# Patient Record
Sex: FEMALE | Race: WHITE | NOT HISPANIC OR LATINO | Employment: FULL TIME | URBAN - METROPOLITAN AREA
[De-identification: names, ages, dates, MRNs, and addresses within clinical notes are randomized per-mention and may not be internally consistent; named-entity substitution may affect disease eponyms.]

---
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
## 2018-01-12 ENCOUNTER — CARE COORDINATION (OUTPATIENT)
Dept: CARE COORDINATION | Facility: CLINIC | Age: 18
End: 2018-01-12

## 2018-02-22 ENCOUNTER — CARE COORDINATION (OUTPATIENT)
Dept: CARE COORDINATION | Facility: CLINIC | Age: 18
End: 2018-02-22

## 2019-08-28 ENCOUNTER — PATIENT OUTREACH (OUTPATIENT)
Dept: CARE COORDINATION | Facility: CLINIC | Age: 19
End: 2019-08-28

## 2019-08-28 DIAGNOSIS — Z65.9 PSYCHOSOCIAL PROBLEM: ICD-10-CM

## 2019-08-28 DIAGNOSIS — O30.90: Primary | ICD-10-CM

## 2019-08-28 DIAGNOSIS — Z65.8: Primary | ICD-10-CM

## 2019-08-28 ASSESSMENT — ACTIVITIES OF DAILY LIVING (ADL)
DEPENDENT_IADLS:: INDEPENDENT
DEPENDENT_IADLS:: INDEPENDENT

## 2019-10-08 ENCOUNTER — PATIENT OUTREACH (OUTPATIENT)
Dept: CARE COORDINATION | Facility: CLINIC | Age: 19
End: 2019-10-08

## 2024-03-07 LAB
HEPATITIS B SURFACE ANTIGEN (EXTERNAL): NEGATIVE
HIV1+2 AB SERPL QL IA: NONREACTIVE
RUBELLA ANTIBODY IGG (EXTERNAL): POSITIVE

## 2024-09-20 LAB — GROUP B STREPTOCOCCUS (EXTERNAL): NEGATIVE

## 2024-10-18 ENCOUNTER — HOSPITAL ENCOUNTER (INPATIENT)
Facility: CLINIC | Age: 24
LOS: 3 days | Discharge: HOME | End: 2024-10-21
Attending: OBSTETRICS & GYNECOLOGY | Admitting: OBSTETRICS & GYNECOLOGY
Payer: COMMERCIAL

## 2024-10-18 PROBLEM — Z36.89 ENCOUNTER FOR TRIAGE IN PREGNANT PATIENT: Status: ACTIVE | Noted: 2024-10-18

## 2024-10-18 LAB
ABO/RH(D): NORMAL
ALBUMIN MFR UR ELPH: 15.2 MG/DL
ALBUMIN SERPL BCG-MCNC: 3.5 G/DL (ref 3.5–5.2)
ALP SERPL-CCNC: 188 U/L (ref 40–150)
ALT SERPL W P-5'-P-CCNC: 8 U/L (ref 0–50)
ANION GAP SERPL CALCULATED.3IONS-SCNC: 16 MMOL/L (ref 7–15)
ANTIBODY SCREEN: NEGATIVE
AST SERPL W P-5'-P-CCNC: 19 U/L (ref 0–45)
BILIRUB SERPL-MCNC: 0.2 MG/DL
BUN SERPL-MCNC: 9.9 MG/DL (ref 6–20)
CALCIUM SERPL-MCNC: 9.4 MG/DL (ref 8.8–10.4)
CHLORIDE SERPL-SCNC: 103 MMOL/L (ref 98–107)
CREAT SERPL-MCNC: 0.65 MG/DL (ref 0.51–0.95)
CREAT UR-MCNC: 37.9 MG/DL
EGFRCR SERPLBLD CKD-EPI 2021: >90 ML/MIN/1.73M2
ERYTHROCYTE [DISTWIDTH] IN BLOOD BY AUTOMATED COUNT: 14.3 % (ref 10–15)
GLUCOSE BLDC GLUCOMTR-MCNC: 112 MG/DL (ref 70–99)
GLUCOSE SERPL-MCNC: 96 MG/DL (ref 70–99)
HCO3 SERPL-SCNC: 17 MMOL/L (ref 22–29)
HCT VFR BLD AUTO: 40.4 % (ref 35–47)
HGB BLD-MCNC: 13.4 G/DL (ref 11.7–15.7)
MCH RBC QN AUTO: 27.9 PG (ref 26.5–33)
MCHC RBC AUTO-ENTMCNC: 33.2 G/DL (ref 31.5–36.5)
MCV RBC AUTO: 84 FL (ref 78–100)
PLATELET # BLD AUTO: 274 10E3/UL (ref 150–450)
POTASSIUM SERPL-SCNC: 4.1 MMOL/L (ref 3.4–5.3)
PROT SERPL-MCNC: 6.5 G/DL (ref 6.4–8.3)
PROT/CREAT 24H UR: 0.4 MG/MG CR (ref 0–0.2)
RBC # BLD AUTO: 4.8 10E6/UL (ref 3.8–5.2)
SODIUM SERPL-SCNC: 136 MMOL/L (ref 135–145)
SPECIMEN EXPIRATION DATE: NORMAL
WBC # BLD AUTO: 9.6 10E3/UL (ref 4–11)

## 2024-10-18 PROCEDURE — 84156 ASSAY OF PROTEIN URINE: CPT | Performed by: OBSTETRICS & GYNECOLOGY

## 2024-10-18 PROCEDURE — 250N000013 HC RX MED GY IP 250 OP 250 PS 637: Performed by: OBSTETRICS & GYNECOLOGY

## 2024-10-18 PROCEDURE — 36415 COLL VENOUS BLD VENIPUNCTURE: CPT | Performed by: OBSTETRICS & GYNECOLOGY

## 2024-10-18 PROCEDURE — 82310 ASSAY OF CALCIUM: CPT | Performed by: OBSTETRICS & GYNECOLOGY

## 2024-10-18 PROCEDURE — 85014 HEMATOCRIT: CPT | Performed by: OBSTETRICS & GYNECOLOGY

## 2024-10-18 PROCEDURE — 86780 TREPONEMA PALLIDUM: CPT | Performed by: OBSTETRICS & GYNECOLOGY

## 2024-10-18 PROCEDURE — 120N000001 HC R&B MED SURG/OB

## 2024-10-18 PROCEDURE — 86900 BLOOD TYPING SEROLOGIC ABO: CPT | Performed by: OBSTETRICS & GYNECOLOGY

## 2024-10-18 PROCEDURE — G0463 HOSPITAL OUTPT CLINIC VISIT: HCPCS

## 2024-10-18 RX ORDER — LIDOCAINE 40 MG/G
CREAM TOPICAL
Status: DISCONTINUED | OUTPATIENT
Start: 2024-10-18 | End: 2024-10-19

## 2024-10-18 RX ORDER — KETOROLAC TROMETHAMINE 30 MG/ML
30 INJECTION, SOLUTION INTRAMUSCULAR; INTRAVENOUS
Status: DISCONTINUED | OUTPATIENT
Start: 2024-10-18 | End: 2024-10-19

## 2024-10-18 RX ORDER — METOCLOPRAMIDE 10 MG/1
10 TABLET ORAL EVERY 6 HOURS PRN
Status: DISCONTINUED | OUTPATIENT
Start: 2024-10-18 | End: 2024-10-19

## 2024-10-18 RX ORDER — CARBOPROST TROMETHAMINE 250 UG/ML
250 INJECTION, SOLUTION INTRAMUSCULAR
Status: DISCONTINUED | OUTPATIENT
Start: 2024-10-18 | End: 2024-10-19

## 2024-10-18 RX ORDER — METHYLERGONOVINE MALEATE 0.2 MG/ML
200 INJECTION INTRAVENOUS
Status: DISCONTINUED | OUTPATIENT
Start: 2024-10-18 | End: 2024-10-19

## 2024-10-18 RX ORDER — PROCHLORPERAZINE MALEATE 10 MG
10 TABLET ORAL EVERY 6 HOURS PRN
Status: DISCONTINUED | OUTPATIENT
Start: 2024-10-18 | End: 2024-10-19

## 2024-10-18 RX ORDER — MISOPROSTOL 100 UG/1
25 TABLET ORAL
Status: DISCONTINUED | OUTPATIENT
Start: 2024-10-18 | End: 2024-10-19

## 2024-10-18 RX ORDER — ONDANSETRON 4 MG/1
4 TABLET, ORALLY DISINTEGRATING ORAL EVERY 6 HOURS PRN
Status: DISCONTINUED | OUTPATIENT
Start: 2024-10-18 | End: 2024-10-19

## 2024-10-18 RX ORDER — NALOXONE HYDROCHLORIDE 0.4 MG/ML
0.2 INJECTION, SOLUTION INTRAMUSCULAR; INTRAVENOUS; SUBCUTANEOUS
Status: DISCONTINUED | OUTPATIENT
Start: 2024-10-18 | End: 2024-10-19

## 2024-10-18 RX ORDER — OXYTOCIN 10 [USP'U]/ML
10 INJECTION, SOLUTION INTRAMUSCULAR; INTRAVENOUS
Status: DISCONTINUED | OUTPATIENT
Start: 2024-10-18 | End: 2024-10-19

## 2024-10-18 RX ORDER — LOPERAMIDE HYDROCHLORIDE 2 MG/1
2 CAPSULE ORAL
Status: DISCONTINUED | OUTPATIENT
Start: 2024-10-18 | End: 2024-10-19

## 2024-10-18 RX ORDER — ONDANSETRON 2 MG/ML
4 INJECTION INTRAMUSCULAR; INTRAVENOUS EVERY 6 HOURS PRN
Status: DISCONTINUED | OUTPATIENT
Start: 2024-10-18 | End: 2024-10-19

## 2024-10-18 RX ORDER — OXYTOCIN/0.9 % SODIUM CHLORIDE 30/500 ML
100-340 PLASTIC BAG, INJECTION (ML) INTRAVENOUS CONTINUOUS PRN
Status: DISCONTINUED | OUTPATIENT
Start: 2024-10-18 | End: 2024-10-19

## 2024-10-18 RX ORDER — METOCLOPRAMIDE HYDROCHLORIDE 5 MG/ML
10 INJECTION INTRAMUSCULAR; INTRAVENOUS EVERY 6 HOURS PRN
Status: DISCONTINUED | OUTPATIENT
Start: 2024-10-18 | End: 2024-10-19

## 2024-10-18 RX ORDER — NALOXONE HYDROCHLORIDE 0.4 MG/ML
0.4 INJECTION, SOLUTION INTRAMUSCULAR; INTRAVENOUS; SUBCUTANEOUS
Status: DISCONTINUED | OUTPATIENT
Start: 2024-10-18 | End: 2024-10-19

## 2024-10-18 RX ORDER — CITRIC ACID/SODIUM CITRATE 334-500MG
30 SOLUTION, ORAL ORAL
Status: DISCONTINUED | OUTPATIENT
Start: 2024-10-18 | End: 2024-10-19

## 2024-10-18 RX ORDER — MORPHINE SULFATE 10 MG/ML
10 INJECTION, SOLUTION INTRAMUSCULAR; INTRAVENOUS
Status: DISCONTINUED | OUTPATIENT
Start: 2024-10-18 | End: 2024-10-19

## 2024-10-18 RX ORDER — FENTANYL CITRATE 50 UG/ML
100 INJECTION, SOLUTION INTRAMUSCULAR; INTRAVENOUS
Status: DISCONTINUED | OUTPATIENT
Start: 2024-10-18 | End: 2024-10-19

## 2024-10-18 RX ORDER — OXYTOCIN 10 [USP'U]/ML
10 INJECTION, SOLUTION INTRAMUSCULAR; INTRAVENOUS
Status: COMPLETED | OUTPATIENT
Start: 2024-10-18 | End: 2024-10-19

## 2024-10-18 RX ORDER — LEVOTHYROXINE SODIUM 50 UG/1
50 TABLET ORAL DAILY
COMMUNITY

## 2024-10-18 RX ORDER — ACETAMINOPHEN 325 MG/1
650 TABLET ORAL EVERY 4 HOURS PRN
Status: DISCONTINUED | OUTPATIENT
Start: 2024-10-18 | End: 2024-10-19

## 2024-10-18 RX ORDER — LOPERAMIDE HYDROCHLORIDE 2 MG/1
4 CAPSULE ORAL
Status: DISCONTINUED | OUTPATIENT
Start: 2024-10-18 | End: 2024-10-19

## 2024-10-18 RX ORDER — VITAMIN A, VITAMIN C, VITAMIN D-3, VITAMIN E, VITAMIN B-1, VITAMIN B-2, NIACIN, VITAMIN B-6, CALCIUM, IRON, ZINC, COPPER 4000; 120; 400; 22; 1.84; 3; 20; 10; 1; 12; 200; 27; 25; 2 [IU]/1; MG/1; [IU]/1; MG/1; MG/1; MG/1; MG/1; MG/1; MG/1; UG/1; MG/1; MG/1; MG/1; MG/1
1 TABLET ORAL DAILY
COMMUNITY

## 2024-10-18 RX ORDER — TRANEXAMIC ACID 10 MG/ML
1 INJECTION, SOLUTION INTRAVENOUS EVERY 30 MIN PRN
Status: DISCONTINUED | OUTPATIENT
Start: 2024-10-18 | End: 2024-10-19

## 2024-10-18 RX ORDER — HYDROXYZINE HYDROCHLORIDE 50 MG/1
50 TABLET, FILM COATED ORAL
Status: DISCONTINUED | OUTPATIENT
Start: 2024-10-18 | End: 2024-10-19

## 2024-10-18 RX ORDER — OXYTOCIN/0.9 % SODIUM CHLORIDE 30/500 ML
340 PLASTIC BAG, INJECTION (ML) INTRAVENOUS CONTINUOUS PRN
Status: DISCONTINUED | OUTPATIENT
Start: 2024-10-18 | End: 2024-10-19

## 2024-10-18 RX ORDER — MISOPROSTOL 200 UG/1
800 TABLET ORAL
Status: DISCONTINUED | OUTPATIENT
Start: 2024-10-18 | End: 2024-10-19

## 2024-10-18 RX ORDER — LIDOCAINE 40 MG/G
CREAM TOPICAL
Status: DISCONTINUED | OUTPATIENT
Start: 2024-10-18 | End: 2024-10-18 | Stop reason: HOSPADM

## 2024-10-18 RX ORDER — MISOPROSTOL 200 UG/1
400 TABLET ORAL
Status: DISCONTINUED | OUTPATIENT
Start: 2024-10-18 | End: 2024-10-19

## 2024-10-18 RX ORDER — IBUPROFEN 800 MG/1
800 TABLET, FILM COATED ORAL
Status: DISCONTINUED | OUTPATIENT
Start: 2024-10-18 | End: 2024-10-19

## 2024-10-18 RX ADMIN — MISOPROSTOL 25 MCG: 100 TABLET ORAL at 22:27

## 2024-10-18 ASSESSMENT — ACTIVITIES OF DAILY LIVING (ADL)
ADLS_ACUITY_SCORE: 18
ADLS_ACUITY_SCORE: 18
ADLS_ACUITY_SCORE: 35
ADLS_ACUITY_SCORE: 35

## 2024-10-19 ENCOUNTER — ANESTHESIA (OUTPATIENT)
Dept: OBGYN | Facility: CLINIC | Age: 24
End: 2024-10-19
Payer: COMMERCIAL

## 2024-10-19 ENCOUNTER — ANESTHESIA EVENT (OUTPATIENT)
Dept: OBGYN | Facility: CLINIC | Age: 24
End: 2024-10-19
Payer: COMMERCIAL

## 2024-10-19 LAB
GLUCOSE BLDC GLUCOMTR-MCNC: 84 MG/DL (ref 70–99)
GLUCOSE BLDC GLUCOMTR-MCNC: 88 MG/DL (ref 70–99)
GLUCOSE BLDC GLUCOMTR-MCNC: 94 MG/DL (ref 70–99)
T PALLIDUM AB SER QL: NONREACTIVE

## 2024-10-19 PROCEDURE — 3E0R3BZ INTRODUCTION OF ANESTHETIC AGENT INTO SPINAL CANAL, PERCUTANEOUS APPROACH: ICD-10-PCS | Performed by: ANESTHESIOLOGY

## 2024-10-19 PROCEDURE — 250N000009 HC RX 250: Performed by: OBSTETRICS & GYNECOLOGY

## 2024-10-19 PROCEDURE — 250N000013 HC RX MED GY IP 250 OP 250 PS 637: Performed by: OBSTETRICS & GYNECOLOGY

## 2024-10-19 PROCEDURE — 370N000003 HC ANESTHESIA WARD SERVICE: Performed by: ANESTHESIOLOGY

## 2024-10-19 PROCEDURE — 120N000001 HC R&B MED SURG/OB

## 2024-10-19 PROCEDURE — 258N000003 HC RX IP 258 OP 636: Performed by: ANESTHESIOLOGY

## 2024-10-19 PROCEDURE — 250N000011 HC RX IP 250 OP 636: Performed by: OBSTETRICS & GYNECOLOGY

## 2024-10-19 PROCEDURE — 0KQM0ZZ REPAIR PERINEUM MUSCLE, OPEN APPROACH: ICD-10-PCS | Performed by: OBSTETRICS & GYNECOLOGY

## 2024-10-19 PROCEDURE — 250N000011 HC RX IP 250 OP 636: Performed by: ANESTHESIOLOGY

## 2024-10-19 PROCEDURE — 722N000001 HC LABOR CARE VAGINAL DELIVERY SINGLE

## 2024-10-19 PROCEDURE — 00HU33Z INSERTION OF INFUSION DEVICE INTO SPINAL CANAL, PERCUTANEOUS APPROACH: ICD-10-PCS | Performed by: ANESTHESIOLOGY

## 2024-10-19 PROCEDURE — 250N000009 HC RX 250: Performed by: ANESTHESIOLOGY

## 2024-10-19 PROCEDURE — 250N000011 HC RX IP 250 OP 636: Mod: JZ | Performed by: ANESTHESIOLOGY

## 2024-10-19 RX ORDER — ONDANSETRON 2 MG/ML
4 INJECTION INTRAMUSCULAR; INTRAVENOUS EVERY 6 HOURS PRN
Status: DISCONTINUED | OUTPATIENT
Start: 2024-10-19 | End: 2024-10-19

## 2024-10-19 RX ORDER — NALBUPHINE HYDROCHLORIDE 10 MG/ML
2.5-5 INJECTION INTRAMUSCULAR; INTRAVENOUS; SUBCUTANEOUS EVERY 6 HOURS PRN
Status: DISCONTINUED | OUTPATIENT
Start: 2024-10-19 | End: 2024-10-19

## 2024-10-19 RX ORDER — SCOLOPAMINE TRANSDERMAL SYSTEM 1 MG/1
1 PATCH, EXTENDED RELEASE TRANSDERMAL
Status: COMPLETED | OUTPATIENT
Start: 2024-10-19 | End: 2024-10-20

## 2024-10-19 RX ORDER — BUPIVACAINE HYDROCHLORIDE 2.5 MG/ML
INJECTION, SOLUTION EPIDURAL; INFILTRATION; INTRACAUDAL
Status: COMPLETED | OUTPATIENT
Start: 2024-10-19 | End: 2024-10-19

## 2024-10-19 RX ORDER — CARBOPROST TROMETHAMINE 250 UG/ML
250 INJECTION, SOLUTION INTRAMUSCULAR
Status: DISCONTINUED | OUTPATIENT
Start: 2024-10-19 | End: 2024-10-21 | Stop reason: HOSPADM

## 2024-10-19 RX ORDER — BISACODYL 10 MG
10 SUPPOSITORY, RECTAL RECTAL DAILY PRN
Status: DISCONTINUED | OUTPATIENT
Start: 2024-10-19 | End: 2024-10-21 | Stop reason: HOSPADM

## 2024-10-19 RX ORDER — LIDOCAINE 40 MG/G
CREAM TOPICAL
Status: DISCONTINUED | OUTPATIENT
Start: 2024-10-19 | End: 2024-10-19

## 2024-10-19 RX ORDER — MISOPROSTOL 200 UG/1
800 TABLET ORAL
Status: DISCONTINUED | OUTPATIENT
Start: 2024-10-19 | End: 2024-10-21 | Stop reason: HOSPADM

## 2024-10-19 RX ORDER — TRANEXAMIC ACID 10 MG/ML
1 INJECTION, SOLUTION INTRAVENOUS EVERY 30 MIN PRN
Status: DISCONTINUED | OUTPATIENT
Start: 2024-10-19 | End: 2024-10-21 | Stop reason: HOSPADM

## 2024-10-19 RX ORDER — ACETAMINOPHEN 325 MG/1
650 TABLET ORAL EVERY 4 HOURS PRN
Status: DISCONTINUED | OUTPATIENT
Start: 2024-10-19 | End: 2024-10-21 | Stop reason: HOSPADM

## 2024-10-19 RX ORDER — IBUPROFEN 800 MG/1
800 TABLET, FILM COATED ORAL EVERY 6 HOURS PRN
Status: DISCONTINUED | OUTPATIENT
Start: 2024-10-19 | End: 2024-10-21 | Stop reason: HOSPADM

## 2024-10-19 RX ORDER — HYDROCORTISONE 25 MG/G
CREAM TOPICAL 3 TIMES DAILY PRN
Status: DISCONTINUED | OUTPATIENT
Start: 2024-10-19 | End: 2024-10-21 | Stop reason: HOSPADM

## 2024-10-19 RX ORDER — NICOTINE POLACRILEX 4 MG
15-30 LOZENGE BUCCAL
Status: DISCONTINUED | OUTPATIENT
Start: 2024-10-19 | End: 2024-10-19

## 2024-10-19 RX ORDER — DOCUSATE SODIUM 100 MG/1
100 CAPSULE, LIQUID FILLED ORAL DAILY
Status: DISCONTINUED | OUTPATIENT
Start: 2024-10-19 | End: 2024-10-21 | Stop reason: HOSPADM

## 2024-10-19 RX ORDER — LEVOTHYROXINE SODIUM 50 UG/1
50 TABLET ORAL
Status: DISCONTINUED | OUTPATIENT
Start: 2024-10-19 | End: 2024-10-21 | Stop reason: HOSPADM

## 2024-10-19 RX ORDER — OXYTOCIN/0.9 % SODIUM CHLORIDE 30/500 ML
340 PLASTIC BAG, INJECTION (ML) INTRAVENOUS CONTINUOUS PRN
Status: DISCONTINUED | OUTPATIENT
Start: 2024-10-19 | End: 2024-10-21 | Stop reason: HOSPADM

## 2024-10-19 RX ORDER — LOPERAMIDE HYDROCHLORIDE 2 MG/1
2 CAPSULE ORAL
Status: DISCONTINUED | OUTPATIENT
Start: 2024-10-19 | End: 2024-10-21 | Stop reason: HOSPADM

## 2024-10-19 RX ORDER — LOPERAMIDE HYDROCHLORIDE 2 MG/1
4 CAPSULE ORAL
Status: DISCONTINUED | OUTPATIENT
Start: 2024-10-19 | End: 2024-10-21 | Stop reason: HOSPADM

## 2024-10-19 RX ORDER — DEXTROSE MONOHYDRATE 25 G/50ML
25-50 INJECTION, SOLUTION INTRAVENOUS
Status: DISCONTINUED | OUTPATIENT
Start: 2024-10-19 | End: 2024-10-19

## 2024-10-19 RX ORDER — MODIFIED LANOLIN
OINTMENT (GRAM) TOPICAL
Status: DISCONTINUED | OUTPATIENT
Start: 2024-10-19 | End: 2024-10-21 | Stop reason: HOSPADM

## 2024-10-19 RX ORDER — SODIUM CHLORIDE, SODIUM LACTATE, POTASSIUM CHLORIDE, CALCIUM CHLORIDE 600; 310; 30; 20 MG/100ML; MG/100ML; MG/100ML; MG/100ML
INJECTION, SOLUTION INTRAVENOUS CONTINUOUS PRN
Status: DISCONTINUED | OUTPATIENT
Start: 2024-10-19 | End: 2024-10-19

## 2024-10-19 RX ORDER — OXYTOCIN/0.9 % SODIUM CHLORIDE 30/500 ML
1-24 PLASTIC BAG, INJECTION (ML) INTRAVENOUS CONTINUOUS
Status: DISCONTINUED | OUTPATIENT
Start: 2024-10-19 | End: 2024-10-19

## 2024-10-19 RX ORDER — MISOPROSTOL 200 UG/1
400 TABLET ORAL
Status: DISCONTINUED | OUTPATIENT
Start: 2024-10-19 | End: 2024-10-21 | Stop reason: HOSPADM

## 2024-10-19 RX ORDER — OXYTOCIN 10 [USP'U]/ML
10 INJECTION, SOLUTION INTRAMUSCULAR; INTRAVENOUS
Status: DISCONTINUED | OUTPATIENT
Start: 2024-10-19 | End: 2024-10-21 | Stop reason: HOSPADM

## 2024-10-19 RX ORDER — ONDANSETRON 4 MG/1
4 TABLET, ORALLY DISINTEGRATING ORAL EVERY 6 HOURS PRN
Status: DISCONTINUED | OUTPATIENT
Start: 2024-10-19 | End: 2024-10-19

## 2024-10-19 RX ORDER — BUPIVACAINE HYDROCHLORIDE 2.5 MG/ML
10 INJECTION, SOLUTION EPIDURAL; INFILTRATION; INTRACAUDAL ONCE
Status: DISCONTINUED | OUTPATIENT
Start: 2024-10-19 | End: 2024-10-19

## 2024-10-19 RX ORDER — METHYLERGONOVINE MALEATE 0.2 MG/ML
200 INJECTION INTRAVENOUS
Status: DISCONTINUED | OUTPATIENT
Start: 2024-10-19 | End: 2024-10-21 | Stop reason: HOSPADM

## 2024-10-19 RX ORDER — FENTANYL CITRATE-0.9 % NACL/PF 10 MCG/ML
100 PLASTIC BAG, INJECTION (ML) INTRAVENOUS EVERY 5 MIN PRN
Status: DISCONTINUED | OUTPATIENT
Start: 2024-10-19 | End: 2024-10-19

## 2024-10-19 RX ADMIN — MISOPROSTOL 25 MCG: 100 TABLET ORAL at 00:28

## 2024-10-19 RX ADMIN — Medication 340 ML/HR: at 11:43

## 2024-10-19 RX ADMIN — BUPIVACAINE HYDROCHLORIDE 10 ML: 2.5 INJECTION, SOLUTION EPIDURAL; INFILTRATION; INTRACAUDAL at 09:43

## 2024-10-19 RX ADMIN — LEVOTHYROXINE SODIUM 50 MCG: 0.05 TABLET ORAL at 06:58

## 2024-10-19 RX ADMIN — IBUPROFEN 800 MG: 800 TABLET, FILM COATED ORAL at 16:57

## 2024-10-19 RX ADMIN — Medication: at 10:37

## 2024-10-19 RX ADMIN — SCOPALAMINE 1 PATCH: 1 PATCH, EXTENDED RELEASE TRANSDERMAL at 08:47

## 2024-10-19 RX ADMIN — OXYTOCIN 10 UNITS: 10 INJECTION, SOLUTION INTRAMUSCULAR; INTRAVENOUS at 12:09

## 2024-10-19 RX ADMIN — MISOPROSTOL 25 MCG: 100 TABLET ORAL at 02:42

## 2024-10-19 RX ADMIN — SODIUM CHLORIDE, POTASSIUM CHLORIDE, SODIUM LACTATE AND CALCIUM CHLORIDE 500 ML: 600; 310; 30; 20 INJECTION, SOLUTION INTRAVENOUS at 08:42

## 2024-10-19 ASSESSMENT — ACTIVITIES OF DAILY LIVING (ADL)
ADLS_ACUITY_SCORE: 18

## 2024-10-19 NOTE — ANESTHESIA PREPROCEDURE EVALUATION
"Anesthesia Pre-Procedure Evaluation    Patient: Georgina Mckinley   MRN: [id number] : 2000        Procedure : * No procedures listed *          Past Medical History:   Diagnosis Date    Thyroid disease     hypothyroidism      History reviewed. No pertinent surgical history.   No Known Allergies   Social History     Tobacco Use    Smoking status: Never    Smokeless tobacco: Never   Substance Use Topics    Alcohol use: Not Currently      Wt Readings from Last 1 Encounters:   10/18/24 86.1 kg (189 lb 12.8 oz)        Anesthesia Evaluation            ROS/MED HX  ENT/Pulmonary:       Neurologic:       Cardiovascular:     (+)  - - PIH  -  - -                                      METS/Exercise Tolerance:     Hematologic:  - neg hematologic  ROS     Musculoskeletal:       GI/Hepatic:       Renal/Genitourinary:       Endo:     (+)               Obesity,       Psychiatric/Substance Use:       Infectious Disease:       Malignancy:       Other:    at 40+2 presented with elevated home BP readings, admitted for IOL            OUTSIDE LABS:  CBC:   Lab Results   Component Value Date    WBC 9.6 10/18/2024    HGB 13.4 10/18/2024    HCT 40.4 10/18/2024     10/18/2024     BMP:   Lab Results   Component Value Date     10/18/2024    POTASSIUM 4.1 10/18/2024    CHLORIDE 103 10/18/2024    CO2 17 (L) 10/18/2024    BUN 9.9 10/18/2024    CR 0.65 10/18/2024    GLC 94 10/19/2024    GLC 96 10/18/2024     COAGS: No results found for: \"PTT\", \"INR\", \"FIBR\"  POC: No results found for: \"BGM\", \"HCG\", \"HCGS\"  HEPATIC:   Lab Results   Component Value Date    ALBUMIN 3.5 10/18/2024    PROTTOTAL 6.5 10/18/2024    ALT 8 10/18/2024    AST 19 10/18/2024    ALKPHOS 188 (H) 10/18/2024    BILITOTAL 0.2 10/18/2024     OTHER:   Lab Results   Component Value Date    CRISELDA 9.4 10/18/2024       Anesthesia Plan    ASA Status:  2       Anesthesia Type: Epidural.              Consents    Anesthesia Plan(s) and associated risks, benefits, " and realistic alternatives discussed. Questions answered and patient/representative(s) expressed understanding.     - Discussed:     - Discussed with:  Patient            Postoperative Care            Comments:    Other Comments: Continuous Labor Epidural: Indication is for labor pain. Following a discussion of the procedure, epidural expectations, and risks and benefits (risks including, but not limited to, nerve damage, infection, bleeding/hematoma, inadvertent dural puncture, spinal headache, partial or failed block possibly requiring epidural replacement), the patient appears to understand and consents to proceed. Questions were encouraged and answered prior to placement.              Guerda Hong MD    I have reviewed the pertinent notes and labs in the chart from the past 30 days and (re)examined the patient.  Any updates or changes from those notes are reflected in this note.

## 2024-10-19 NOTE — PROVIDER NOTIFICATION
10/19/24 1022   Provider Notification   Provider Name/Title Dr. MOY Vidales   Method of Notification Electronic Page   Notification Reason Status Update;SVE     Pt Is complete, will straight cath and set up to push

## 2024-10-19 NOTE — PLAN OF CARE
"  Data: Georgina Mckinley transferred to [facility] via wheelchair at 1450. Baby transferred via parent's arms.  Action: Receiving unit notified of transfer: Yes. Patient and family notified of room change. Report given to Amy at 1515. Belongings sent to receiving unit. Accompanied by Registered Nurse. Oriented patient to surroundings. Call light within reach. ID bands double-checked with receiving RN.  Response: Patient tolerated transfer and is stable.  Pt is bonding well with baby girl. Pt is breastfeeding and supplementing with EBM. Tolerates eating and drinking.  Fundus is firm, midline and 2 cm below umbilicus. Lochia is scant, rubra and pt denies having any clots. VSS. Voiding and passing gas. Ambulating independently. Tolerating perineal pain well with and ice.- rating a 0/10.     Problem: Adult Inpatient Plan of Care  Goal: Plan of Care Review  Description: The Plan of Care Review/Shift note should be completed every shift.  The Outcome Evaluation is a brief statement about your assessment that the patient is improving, declining, or no change.  This information will be displayed automatically on your shift  note.  10/19/2024 1520 by Alessandra Willis RN  Outcome: Progressing  Flowsheets (Taken 10/19/2024 1520)  Outcome Evaluation: VSS. tolerting pain with ice packs. Due to void. up independently. No IV.  Plan of Care Reviewed With: patient  10/19/2024 1519 by Alessandra Willis RN  Outcome: Progressing  Goal: Patient-Specific Goal (Individualized)  Description: You can add care plan individualizations to a care plan. Examples of Individualization might be:  \"Parent requests to be called daily at 9am for status\", \"I have a hard time hearing out of my right ear\", or \"Do not touch me to wake me up as it startles  me\".  10/19/2024 1520 by Alessandra Willis RN  Outcome: Progressing  10/19/2024 1519 by Alessandra Willis RN  Outcome: Progressing  Goal: Absence of Hospital-Acquired Illness or " Injury  10/19/2024 1520 by Alessandra Willis RN  Outcome: Progressing  10/19/2024 1519 by Alessandra Willis RN  Outcome: Progressing  Intervention: Prevent Skin Injury  Recent Flowsheet Documentation  Taken 10/19/2024 1220 by Alessandra Willis RN  Body Position: position changed independently  Goal: Optimal Comfort and Wellbeing  10/19/2024 1520 by Alessandra Willis RN  Outcome: Progressing  10/19/2024 1519 by Alessandra Willis RN  Outcome: Progressing  Intervention: Provide Person-Centered Care  Recent Flowsheet Documentation  Taken 10/19/2024 1220 by Alessandra Willis RN  Trust Relationship/Rapport:   care explained   choices provided   questions answered   questions encouraged   thoughts/feelings acknowledged  Goal: Readiness for Transition of Care  10/19/2024 1520 by Alessandra Willis RN  Outcome: Progressing  10/19/2024 1519 by Alessandra Willis RN  Outcome: Progressing     Problem: Hypertensive Disorders in Pregnancy  Goal: Patient-Fetal Stabilization  10/19/2024 1520 by Alessandra Willis RN  Outcome: Progressing  10/19/2024 1519 by Alessandra Willis RN  Outcome: Progressing  Intervention: Optimize Blood Pressure and Fluid Status  Recent Flowsheet Documentation  Taken 10/19/2024 1220 by Alessandra Willis RN  Fluid/Electrolyte Management: fluids provided     Problem: Diabetes in Pregnancy  Goal: Optimal Coping  10/19/2024 1520 by Alessandra Willis RN  Outcome: Progressing  10/19/2024 1519 by Alessandra Willis RN  Outcome: Progressing  Intervention: Support Wellbeing and Self-Management Success  Recent Flowsheet Documentation  Taken 10/19/2024 1220 by Alessandra Willis RN  Supportive Measures:   active listening utilized   decision-making supported   self-care encouraged   self-reflection promoted   verbalization of feelings encouraged  Family/Support System Care:   involvement promoted   presence promoted   support provided  Goal: Blood Glucose Level Within Targeted Range  10/19/2024  1520 by Alessandra Willis RN  Outcome: Progressing  10/19/2024 1519 by Alessandra Willis RN  Outcome: Progressing  Intervention: Monitor and Manage Glycemia  Recent Flowsheet Documentation  Taken 10/19/2024 1220 by Alessandra Willis RN  Glycemic Management: blood glucose monitored     Problem: Postpartum (Vaginal Delivery)  Goal: Successful Parent Role Transition  10/19/2024 1520 by Alessandra Willis RN  Outcome: Progressing  10/19/2024 1519 by Alessandra Willis RN  Outcome: Progressing  Intervention: Support Parent Role Transition  Recent Flowsheet Documentation  Taken 10/19/2024 1220 by Alessandra Willis RN  Supportive Measures:   active listening utilized   decision-making supported   self-care encouraged   self-reflection promoted   verbalization of feelings encouraged  Parent-Child Attachment Promotion:   caring behavior modeled   cue recognition promoted   face-to-face positioning promoted   interaction encouraged   parent/caregiver presence encouraged   participation in care promoted   positive reinforcement provided   rooming-in promoted   skin-to-skin contact encouraged  Goal: Hemostasis  10/19/2024 1520 by Alessandra Willis RN  Outcome: Progressing  10/19/2024 1519 by Alessandra Willis RN  Outcome: Progressing  Intervention: Manage Bleeding  Recent Flowsheet Documentation  Taken 10/19/2024 1220 by Alessandra Willis RN  Syncope Management: position changed slowly  Goal: Absence of Infection Signs and Symptoms  10/19/2024 1520 by Alessandra Willis RN  Outcome: Progressing  10/19/2024 1519 by Alessandra Willis RN  Outcome: Progressing  Intervention: Prevent or Manage Infection  Recent Flowsheet Documentation  Taken 10/19/2024 1220 by Alessandra Willis RN  Infection Management: aseptic technique maintained  Goal: Anesthesia/Sedation Recovery  10/19/2024 1520 by Alessandra Willis RN  Outcome: Progressing  10/19/2024 1519 by Alessandra Willis RN  Outcome: Progressing  Goal: Optimal  Pain Control and Function  10/19/2024 1520 by Alessandra Willis RN  Outcome: Progressing  10/19/2024 1519 by Alessandra Willis RN  Outcome: Progressing  Goal: Effective Urinary Elimination  10/19/2024 1520 by Alessandra Willis RN  Outcome: Progressing  10/19/2024 1519 by Alessandra Willis RN  Outcome: Progressing

## 2024-10-19 NOTE — PLAN OF CARE
"Problem: Adult Inpatient Plan of Care  Goal: Plan of Care Review  Description: The Plan of Care Review/Shift note should be completed every shift.  The Outcome Evaluation is a brief statement about your assessment that the patient is improving, declining, or no change.  This information will be displayed automatically on your shift  note.  Outcome: Progressing  Goal: Patient-Specific Goal (Individualized)  Description: You can add care plan individualizations to a care plan. Examples of Individualization might be:  \"Parent requests to be called daily at 9am for status\", \"I have a hard time hearing out of my right ear\", or \"Do not touch me to wake me up as it startles  me\".  Outcome: Progressing  Goal: Absence of Hospital-Acquired Illness or Injury  Outcome: Progressing  Goal: Optimal Comfort and Wellbeing  Outcome: Progressing  Intervention: Provide Person-Centered Care  Recent Flowsheet Documentation  Taken 10/18/2024 2236 by Zayda Graham, RN  Trust Relationship/Rapport:   care explained   choices provided   questions answered   questions encouraged   thoughts/feelings acknowledged  Goal: Readiness for Transition of Care  Outcome: Progressing  Intervention: Mutually Develop Transition Plan  Recent Flowsheet Documentation  Taken 10/18/2024 2138 by Zayda Graham, RN  Equipment Currently Used at Home: none     Problem: Labor  Goal: Hemostasis  Outcome: Progressing  Goal: Stable Fetal Wellbeing  Outcome: Progressing  Goal: Effective Progression to Delivery  Outcome: Progressing  Goal: Absence of Infection Signs and Symptoms  Outcome: Progressing  Goal: Acceptable Pain Control  Outcome: Progressing  Goal: Normal Uterine Contraction Pattern  Outcome: Progressing     Problem: Hypertensive Disorders in Pregnancy  Goal: Patient-Fetal Stabilization  Outcome: Progressing     Problem: Diabetes in Pregnancy  Goal: Optimal Coping  Outcome: Progressing  Goal: Blood Glucose Level Within Targeted Range  Outcome: " Progressing

## 2024-10-19 NOTE — PROVIDER NOTIFICATION
10/19/24 0930   Provider Notification   Provider Name/Title Dr. Hal MACIAS   Method of Notification At Bedside   Notification Reason Other (Comment)  (epidural)

## 2024-10-19 NOTE — PROVIDER NOTIFICATION
10/19/24 1205   Provider Notification   Provider Name/Title Dr. MOY Vidales   Method of Notification In Department     IV infiltrated, pitocin hasn't fully run in the IV yet. Can we do IM pitocin?    MD okay with IM pitocin and okay to wait on another IV placement, will place a new IV if needed for any extra bleeding or BP issues.

## 2024-10-19 NOTE — PROVIDER NOTIFICATION
10/19/24 0955   Provider Notification   Provider Name/Title Dr. MOY Vidales   Method of Notification Electronic Page   Notification Reason Status Update;SVE     Updated MD through [employer] messaging, pt requested epidural was 5/90/-1, just received epidural, having more earlys present on FHR tracing.    MD is now on unit and reviewed tracing, MD would like SVE with gilmore placement.

## 2024-10-19 NOTE — ANESTHESIA PROCEDURE NOTES
Epidural catheter Procedure Note    Pre-Procedure   Staff -        Anesthesiologist:  Guerda Hong MD       Performed By: anesthesiologist       Referred By: MOY Vidales       Location: OB       Pre-Anesthestic Checklist: patient identified, IV checked, risks and benefits discussed, informed consent, monitors and equipment checked, pre-op evaluation and at physician/surgeon's request  Timeout:       Correct Patient: Yes        Correct Procedure: Yes        Correct Site: Yes        Correct Position: Yes   Procedure Documentation  Procedure: epidural catheter       Diagnosis: Labor pain       Patient Position: sitting       Skin prep: Chloraprep       Local skin infiltrated with 2 mL of 1% lidocaine.        Insertion Site: L4-5. (midline approach).       Technique: LORT saline        VIRGIL at 5 cm.       Needle Type: Touhy needle       Needle Gauge: 17.        Needle Length (Inches): 3.5        Catheter: 19 G.          Catheter threaded easily.         5 cm epidural space.         Threaded 10 cm at skin.         # of attempts: 1 and  # of redirects:  0    Assessment/Narrative         Paresthesias: No.       Test dose of 3 mL lidocaine 1.5% w/ 1:200,000 epinephrine at 09:40 CDT.         Test dose negative, 3 minutes after injection, for signs of intravascular, subdural, or intrathecal injection.       Insertion/Infusion Method: LORT saline       Aspiration negative for Heme or CSF via Epidural Catheter.    Medication(s) Administered   0.25% Bupivacaine PF (Epidural) - EPIDURAL   10 mL - 10/19/2024 9:43:00 AM   Comments:  Procedure tolerated well without apparent complications. Repeat aspiration negative for CSF. Initial MDA bolus of 0.25% bupivacaine was incrementally given without difficulty or complications. No abrupt changes in sensation or hemodynamic instability.  Epidural infusion (0.125% bupi with fentanyl 2mcg/ml) started at 10 ml/hr with PCEA of 5 ml q15min with a max cumulative dose of 25 ml/hr. PCEA  "instructions given and use encouraged PRN. Epidural expectations again reviewed and questions answered. Patient hemodynamically stable.  Epidural functionality to be reassessed later via vital sign flowsheet, nursing communication, and/or patient report.  Anesthesiologist immediately available for ongoing assessment and management.            FOR [facility] ([facility]) ONLY:   Pain Team Contact information: please page the Pain Team Via [employer]. Search \"Pain\". During daytime hours, please page the attending first. At night please page the resident first.      "

## 2024-10-19 NOTE — L&D DELIVERY NOTE
Delivery Note  Diagnosis: Intrauterine pregnancy at 40w3d, gestational hypertension  Procedure: Vaginal delivery  Findings: Liveborn female infant, Apgars were  9 and 9  at 1 and 5 minutes respectively. Infant weight not yet recorded due to skin to skin.  Anesthesia: Epidural    QBL: Delivery QBL (mL): 349    Gerogina Mckinley is a 23 year old  female at 40w3d weeks gestation. Her pregnancy was uncomplicated. She presented for IOL secondary to gestational hypertension.. NST was reactive upon admission. Her cervix required ripening and she was given oral misoprostol.  Her membranes spontaneously ruptured.  Labor progressed spontaneously after that. She received an epidural. Patient progressed to complete and delivered a viable infant without complication after a 1 hour second stage. The infant was placed on the patient's abdomen. Cord clamping was delayed by 120 seconds, at which time the cord was doubly clamped and cut. The third stage was actively managed with external uterine massage, gentle cord traction and pitocin. The placenta delivered spontaneously and intact. Second degree laceration noted and repaired in the usual fashion. Excellent hemostasis was noted. All counts were correct before and after the delivery.     Makeda Vidales MD

## 2024-10-19 NOTE — PLAN OF CARE
"Goal Outcome Evaluation:      Plan of Care Reviewed With: patient    Overall Patient Progress: improvingOverall Patient Progress: improving     Data: VSS. Postpartum checks within normal limits - see flow record. Patient is eating and drinking normally, and ambulating independently. Able to void small amount but was unable to empty bladder, straight cath'd for 800ml.  Patient is breastfeeding, supplementing with EBM & DM and pumping every 2-3 hours. perineum WNL, using Tucks for discomfort.   Action: Patient medicated with Ibuprofen during the shift for pain. See MAR. Patient education done, see flow record.  Response: Patient is bonding well with baby. father at bedside, supportive.  Plan: Continue current plan of care. Anticipate discharge in 1-2 days.       Problem: Adult Inpatient Plan of Care  Goal: Plan of Care Review  Description: The Plan of Care Review/Shift note should be completed every shift.  The Outcome Evaluation is a brief statement about your assessment that the patient is improving, declining, or no change.  This information will be displayed automatically on your shift  note.  Outcome: Progressing  Flowsheets (Taken 10/19/2024 3520)  Plan of Care Reviewed With: patient  Overall Patient Progress: improving  Goal: Patient-Specific Goal (Individualized)  Description: You can add care plan individualizations to a care plan. Examples of Individualization might be:  \"Parent requests to be called daily at 9am for status\", \"I have a hard time hearing out of my right ear\", or \"Do not touch me to wake me up as it startles  me\".  Outcome: Progressing  Goal: Absence of Hospital-Acquired Illness or Injury  Outcome: Progressing  Intervention: Prevent Skin Injury  Recent Flowsheet Documentation  Taken 10/19/2024 1403 by Amy Clifton RN  Body Position: position changed independently  Goal: Optimal Comfort and Wellbeing  Outcome: Progressing  Intervention: Provide Person-Centered Care  Recent Flowsheet " Documentation  Taken 10/19/2024 1733 by Amy Clifton RN  Trust Relationship/Rapport:   care explained   choices provided   questions answered   questions encouraged   thoughts/feelings acknowledged  Goal: Readiness for Transition of Care  Outcome: Progressing     Problem: Hypertensive Disorders in Pregnancy  Goal: Patient-Fetal Stabilization  Outcome: Progressing     Problem: Diabetes in Pregnancy  Goal: Optimal Coping  Outcome: Progressing  Intervention: Support Wellbeing and Self-Management Success  Recent Flowsheet Documentation  Taken 10/19/2024 1733 by Amy Clifton RN  Supportive Measures:   active listening utilized   decision-making supported   self-care encouraged   self-reflection promoted   verbalization of feelings encouraged  Family/Support System Care:   involvement promoted   presence promoted   support provided  Goal: Blood Glucose Level Within Targeted Range  Outcome: Progressing     Problem: Postpartum (Vaginal Delivery)  Goal: Successful Parent Role Transition  Outcome: Progressing  Intervention: Support Parent Role Transition  Recent Flowsheet Documentation  Taken 10/19/2024 1733 by Amy Clifton RN  Supportive Measures:   active listening utilized   decision-making supported   self-care encouraged   self-reflection promoted   verbalization of feelings encouraged  Parent-Child Attachment Promotion:   caring behavior modeled   cue recognition promoted   face-to-face positioning promoted   interaction encouraged   parent/caregiver presence encouraged   participation in care promoted   positive reinforcement provided   rooming-in promoted   skin-to-skin contact encouraged  Goal: Hemostasis  Outcome: Progressing  Goal: Absence of Infection Signs and Symptoms  Outcome: Progressing  Goal: Anesthesia/Sedation Recovery  Outcome: Progressing  Goal: Optimal Pain Control and Function  Outcome: Progressing  Goal: Effective Urinary Elimination  Outcome: Progressing

## 2024-10-19 NOTE — PROVIDER NOTIFICATION
10/18/24 2106   Provider Notification   Provider Name/Title Dr. Alberts   Method of Notification Phone   Request Evaluate - Remote   Notification Reason Patient Arrived     Physician informed of pt's arrival and current maternal and fetal condition. Orders received:   - Admit to labor and delivery to be induced for high blood pressure and GDMA1.   - Oral cytotec for cervical ripening, unless beth score is equal to or greater than 6, then pitocin.   - Labs: CBC, CMP, protein creatinine urine ratio.   Telephone orders read back and verified. Will inform pt of plan of care. Kenia Yan RN on 10/19/2024 at 1:22 AM

## 2024-10-19 NOTE — PLAN OF CARE
Data: [facility] 8:00 PM. Reason for maternal/fetal assessment is elevated blood pressures. Patient reports she has been having high blood pressures in the clinic; therefore, has been monitoring her blood pressures at home. This afternoon/evening she took a shower, got dressed, and took her blood pressure and it was 113/90. She rested, walked up her split-level stairs, and took her blood pressure again and it was 135/95. As a result, she called labor and delivery and was told to be evaluated.   Patient is a . Prenatal record reviewed. Pregnancy  has been complicated by gestational diabetes, diet controlled, and hypothyroidism.   Gestational Age 40w3d. VSS. Fetal movement active. Patient denies uterine contractions, leaking of vaginal fluid/rupture of membranes, vaginal bleeding, abdominal pain, pelvic pressure, nausea, vomiting, headache, visual disturbances, epigastric or URQ pain, significant edema. Support person is present.   Of note, the patient has a medical induction for GDMA1 scheduled for the morning.   Action: Verbal consent for EFM. Triage assessment completed. Bill of rights reviewed.  Response: Patient verbalized agreement with plan. Will contact Dr Greta Alberts with update and for further orders. Kenia Yan RN on 10/19/2024 at 1:17 AM

## 2024-10-19 NOTE — PROVIDER NOTIFICATION
10/19/24 0732   Provider Notification   Provider Name/Title Dr. MOY Vidales   Method of Notification Phone   Request Evaluate - Remote   Notification Reason Status Update     MD called unit for update. Pt got 3 doses of oral cytotec overnight, SROM around 0400, pt is now divine every 3 minutes on her own and wincing through them but states she is coping thus far. Pt's fasting BG was 94, had her morning synthroid and will get breakfast soon. Pt denies any pre-E symptoms. Writer RN discussed pain management options and plan for the day.     MD ordered for patient to keep laboring on her own and recheck her cervix when she requests any pain management, if contractions space out then will start pitocin and recheck cervix, MD to put in pitocin orders.

## 2024-10-19 NOTE — H&P
"Labor and Delivery Admission Note  Chief Complaint:   Chief Complaint   Patient presents with     Induction Of Labor      History of Present Illness: Georgina Mckinley is a 23 year old  at 40w2d who presents with elevated BPs at home and plan for scheduled IOL tomorrow AM.     Vital Signs: /86 (BP Location: Right arm, Patient Position: Semi-Delacruz's, Cuff Size: Adult Regular)   Temp 98.3  F (36.8  C) (Oral)   Resp 13   Ht 1.676 m (5' 6\")   Wt 86.1 kg (189 lb 12.8 oz)   BMI 30.63 kg/m     Physical Exam: General: NAD, mood appropriate  Cardiovascular: Regular rate  Pulmonary: Non-labored  Abdomen: Gravid, non-tender  Extremities: Warm and well perfused  Fetal Presentation: Vertex  Cervical Exam:  1/50/-2  Fetal Assessment: moderate variablility, + accels, no decels, Category I    Assessment and Plan:  Georgina Mckinley is a 23 year old  being admitted to L&D for IOL 2/2 gHTN  - cytotec for ripening  - Pitocin if beth >6  - preE labs sent    JOSE ANGEL MOYA MD  10/18/2024, 10:53 PM  "

## 2024-10-19 NOTE — PROVIDER NOTIFICATION
"   10/19/24 9367   Provider Notification   Provider Name/Title Dr. Alberts   Method of Notification Electronic Page     \"YOAN the induction for GHTN has 0.4 protein/creat ratio in urine. BPs are 120s/80s. No symptoms. She received three doses of PO Cytotec and ruptured at 0400 and began to contract every 2-3 minutes. SVE upon arrival was 0.5/50/-3. Now she is 2/60/-2. Ospina still a 4. She is divine regularly and uncomfortable with contractions. Are you ok to let her labor and reassess in morning?\"    MD at 0644 ok with plan of care.   "

## 2024-10-20 PROCEDURE — 250N000013 HC RX MED GY IP 250 OP 250 PS 637: Performed by: OBSTETRICS & GYNECOLOGY

## 2024-10-20 PROCEDURE — 120N000001 HC R&B MED SURG/OB

## 2024-10-20 RX ADMIN — IBUPROFEN 800 MG: 800 TABLET, FILM COATED ORAL at 08:11

## 2024-10-20 RX ADMIN — DOCUSATE SODIUM 100 MG: 100 CAPSULE, LIQUID FILLED ORAL at 08:11

## 2024-10-20 RX ADMIN — IBUPROFEN 800 MG: 800 TABLET, FILM COATED ORAL at 02:18

## 2024-10-20 RX ADMIN — LEVOTHYROXINE SODIUM 50 MCG: 0.05 TABLET ORAL at 06:15

## 2024-10-20 RX ADMIN — IBUPROFEN 800 MG: 800 TABLET, FILM COATED ORAL at 15:06

## 2024-10-20 RX ADMIN — IBUPROFEN 800 MG: 800 TABLET, FILM COATED ORAL at 21:53

## 2024-10-20 ASSESSMENT — ACTIVITIES OF DAILY LIVING (ADL)
ADLS_ACUITY_SCORE: 18

## 2024-10-20 NOTE — ANESTHESIA POSTPROCEDURE EVALUATION
Patient: Georgina Mckinley    Procedure: * No procedures listed *       Anesthesia Type:  Epidural    Note:     Postop Pain Control: Uneventful            Sign Out: Well controlled pain   PONV: No   Neuro/Psych: Uneventful            Sign Out: Acceptable/Baseline neuro status   Airway/Respiratory:             Sign Out: Acceptable/Baseline resp. status   CV/Hemodynamics:             Sign Out: Acceptable CV status   Other NRE:    DID A NON-ROUTINE EVENT OCCUR?     Event details/Postop Comments:  Chart review only    .Labor Epidural Post delivery note.      Doing well. VSS Temp normal. Satisfactory respiratory and cardiovascular function. Return of neurologic function. Denies positional headache. Minimal side effects easily managed w/ PRN meds. No apparent anesthetic complications. No follow-up required.    I or my partner was immediately available for epidural management.    TASHA Monzon                 Last vitals:  Vitals:    10/19/24 2113 10/20/24 0219 10/20/24 0812   BP: 138/89 125/84    Pulse:  61    Resp: 16 16 16   Temp: 99  F (37.2  C) 98.9  F (37.2  C) 98.7  F (37.1  C)   SpO2:          Electronically Signed By: Alexander Monzon DO  October 20, 2024  11:36 AM

## 2024-10-20 NOTE — LACTATION NOTE
Lactation Visit: Writer checked in infant was due to eat and parents had not called. Mother was not quite ready so writer returned when mother available. Infant has been sleepy at breast and not nursing well. Mother has been hand expressing and using donor milk supplementation. Infant placed STS , attempted in cradle hold ,football hold without shield and  with shield . Infant would suck on writers finger with expressed milk but refused to latch on breast. Discussed the importance of starting the feeding prior to the 3 hour joyce as now we are getting closer to 4 hours and infant is just starting the bottle. Mother agreed to set alarm for 2 hours and 45 mins to get infant going unless she is cuing sooner. Discussed pumping with mother . She has pump in room but has only used it one time. Discussed pumping after each feeding and following with hand expression to optimize milk supply and bring it in quicker. Mother agreed. Will follow-up with next feeding and update primary RN.

## 2024-10-20 NOTE — PLAN OF CARE
"Pt is bonding well with baby girl. Pt is breastfeeding and hand expressing about 5 mls and feeding it to baby along with using donor milk. Tolerates eating and drinking.  Fundus is firm, midline and 2 cm below umbilicus. Lochia is scant, rubra and pt denies having any clots. VSS. Voiding and passing gas. Ambulating independently. Tolerating perineal pain well with, ibuprofen and tucks pads.- rating a 3/10.     Goal Outcome Evaluation:      Plan of Care Reviewed With: patient    Overall Patient Progress: improvingOverall Patient Progress: improving    Outcome Evaluation: VSS. Independent with cares. Tolerating perineal pain with ibuprofen and tucks. Breastfeeding and hand expressing.    Problem: Adult Inpatient Plan of Care  Goal: Plan of Care Review  Description: The Plan of Care Review/Shift note should be completed every shift.  The Outcome Evaluation is a brief statement about your assessment that the patient is improving, declining, or no change.  This information will be displayed automatically on your shift  note.  Outcome: Progressing  Flowsheets (Taken 10/20/2024 1342)  Outcome Evaluation: VSS. Independent with cares. Tolerating perineal pain with ibuprofen and tucks. Breastfeeding and hand expressing.  Plan of Care Reviewed With: patient  Overall Patient Progress: improving  Goal: Patient-Specific Goal (Individualized)  Description: You can add care plan individualizations to a care plan. Examples of Individualization might be:  \"Parent requests to be called daily at 9am for status\", \"I have a hard time hearing out of my right ear\", or \"Do not touch me to wake me up as it startles  me\".  Outcome: Progressing  Goal: Absence of Hospital-Acquired Illness or Injury  Outcome: Progressing  Intervention: Prevent Skin Injury  Recent Flowsheet Documentation  Taken 10/20/2024 0817 by Alessandra Willis, RN  Body Position: position changed independently  Taken 10/20/2024 0811 by Alessandra Willis, RN  Body Position: " position changed independently  Intervention: Prevent and Manage VTE (Venous Thromboembolism) Risk  Recent Flowsheet Documentation  Taken 10/20/2024 0817 by Alessandra Willis RN  VTE Prevention/Management: (ambulation and fluids encouraged) other (see comments)  Intervention: Prevent Infection  Recent Flowsheet Documentation  Taken 10/20/2024 0817 by Alessandra Willis RN  Infection Prevention: hand hygiene promoted  Goal: Optimal Comfort and Wellbeing  Outcome: Progressing  Intervention: Provide Person-Centered Care  Recent Flowsheet Documentation  Taken 10/20/2024 0817 by Alessandra Willis RN  Trust Relationship/Rapport:   care explained   choices provided   questions answered   questions encouraged   thoughts/feelings acknowledged  Goal: Readiness for Transition of Care  Outcome: Progressing     Problem: Hypertensive Disorders in Pregnancy  Goal: Patient-Fetal Stabilization  Outcome: Progressing  Intervention: Optimize Blood Pressure and Fluid Status  Recent Flowsheet Documentation  Taken 10/20/2024 0817 by Alessandra Willis RN  Fluid/Electrolyte Management: fluids provided     Problem: Diabetes in Pregnancy  Goal: Optimal Coping  Outcome: Progressing  Intervention: Support Wellbeing and Self-Management Success  Recent Flowsheet Documentation  Taken 10/20/2024 0817 by Alessandra Willis RN  Supportive Measures:   active listening utilized   decision-making supported   self-care encouraged   self-reflection promoted   verbalization of feelings encouraged  Family/Support System Care:   involvement promoted   presence promoted   support provided  Goal: Blood Glucose Level Within Targeted Range  Outcome: Progressing  Intervention: Monitor and Manage Glycemia  Recent Flowsheet Documentation  Taken 10/20/2024 0817 by Alessandra Willis RN  Glycemic Management: blood glucose monitored     Problem: Postpartum (Vaginal Delivery)  Goal: Successful Parent Role Transition  Outcome: Progressing  Intervention: Support  Parent Role Transition  Recent Flowsheet Documentation  Taken 10/20/2024 0817 by Alessandra Willis RN  Supportive Measures:   active listening utilized   decision-making supported   self-care encouraged   self-reflection promoted   verbalization of feelings encouraged  Parent-Child Attachment Promotion:   caring behavior modeled   cue recognition promoted   face-to-face positioning promoted   interaction encouraged   parent/caregiver presence encouraged   participation in care promoted   positive reinforcement provided   rooming-in promoted   skin-to-skin contact encouraged  Goal: Hemostasis  Outcome: Progressing  Intervention: Manage Bleeding  Recent Flowsheet Documentation  Taken 10/20/2024 0817 by Alessandra Willis RN  Syncope Management: position changed slowly  Goal: Absence of Infection Signs and Symptoms  Outcome: Progressing  Intervention: Prevent or Manage Infection  Recent Flowsheet Documentation  Taken 10/20/2024 0817 by Alessandra Willis RN  Infection Management: aseptic technique maintained  Goal: Anesthesia/Sedation Recovery  Outcome: Progressing  Goal: Optimal Pain Control and Function  Outcome: Progressing  Goal: Effective Urinary Elimination  Outcome: Progressing  Intervention: Monitor and Manage Urinary Retention  Recent Flowsheet Documentation  Taken 10/20/2024 0817 by Alessandra Willis RN  Urinary Elimination Promotion: frequent voiding encouraged

## 2024-10-20 NOTE — PLAN OF CARE
Goal Outcome Evaluation:      Plan of Care Reviewed With: patient    Overall Patient Progress: improvingOverall Patient Progress: improving         Patient's vital signs stable, up independently in room. Able to void spontaneously now, drinking adequate fluids. Pain being managed with PRN ibuprofen. Using donor milk to feed baby for now due to baby's low blood sugars at birth, she is hand expressing good amounts of colostrum and using that when available before using donor milk.  at bedside, supportive of patient and baby.       Problem: Adult Inpatient Plan of Care  Goal: Plan of Care Review  Description: The Plan of Care Review/Shift note should be completed every shift.  The Outcome Evaluation is a brief statement about your assessment that the patient is improving, declining, or no change.  This information will be displayed automatically on your shift  note.  Outcome: Progressing  Flowsheets (Taken 10/20/2024 0457)  Plan of Care Reviewed With: patient  Overall Patient Progress: improving  Goal: Absence of Hospital-Acquired Illness or Injury  Outcome: Progressing  Intervention: Prevent Skin Injury  Recent Flowsheet Documentation  Taken 10/20/2024 0219 by Adriana Perales, RN  Body Position: position changed independently  Goal: Optimal Comfort and Wellbeing  Outcome: Progressing  Intervention: Provide Person-Centered Care  Recent Flowsheet Documentation  Taken 10/20/2024 0219 by Adriana Perales, RN  Trust Relationship/Rapport:   care explained   choices provided   questions answered   questions encouraged   thoughts/feelings acknowledged  Goal: Readiness for Transition of Care  Outcome: Progressing     Problem: Hypertensive Disorders in Pregnancy  Goal: Patient-Fetal Stabilization  Outcome: Progressing     Problem: Diabetes in Pregnancy  Goal: Optimal Coping  Outcome: Progressing  Intervention: Support Wellbeing and Self-Management Success  Recent Flowsheet Documentation  Taken 10/20/2024 0219 by  Adriana Perales, RN  Supportive Measures:   active listening utilized   decision-making supported   positive reinforcement provided   self-care encouraged  Family/Support System Care:   involvement promoted   self-care encouraged  Goal: Blood Glucose Level Within Targeted Range  Outcome: Progressing     Problem: Postpartum (Vaginal Delivery)  Goal: Successful Parent Role Transition  Outcome: Progressing  Intervention: Support Parent Role Transition  Recent Flowsheet Documentation  Taken 10/20/2024 0219 by Adriana Perales, RN  Supportive Measures:   active listening utilized   decision-making supported   positive reinforcement provided   self-care encouraged  Parent-Child Attachment Promotion:   caring behavior modeled   cue recognition promoted   face-to-face positioning promoted   interaction encouraged   parent/caregiver presence encouraged   participation in care promoted   positive reinforcement provided   rooming-in promoted   skin-to-skin contact encouraged   strengths emphasized  Goal: Hemostasis  Outcome: Progressing  Goal: Absence of Infection Signs and Symptoms  Outcome: Progressing  Goal: Anesthesia/Sedation Recovery  Outcome: Progressing  Goal: Optimal Pain Control and Function  Outcome: Progressing  Goal: Effective Urinary Elimination  Outcome: Progressing

## 2024-10-21 VITALS
RESPIRATION RATE: 16 BRPM | HEART RATE: 83 BPM | SYSTOLIC BLOOD PRESSURE: 133 MMHG | TEMPERATURE: 98.5 F | OXYGEN SATURATION: 97 % | HEIGHT: 66 IN | BODY MASS INDEX: 28.38 KG/M2 | DIASTOLIC BLOOD PRESSURE: 83 MMHG | WEIGHT: 176.6 LBS

## 2024-10-21 PROCEDURE — 250N000013 HC RX MED GY IP 250 OP 250 PS 637: Performed by: OBSTETRICS & GYNECOLOGY

## 2024-10-21 PROCEDURE — 999N000080 HC STATISTIC IP LACTATION SERVICES 16-30 MIN

## 2024-10-21 RX ADMIN — LEVOTHYROXINE SODIUM 50 MCG: 0.05 TABLET ORAL at 06:47

## 2024-10-21 RX ADMIN — DOCUSATE SODIUM 100 MG: 100 CAPSULE, LIQUID FILLED ORAL at 09:13

## 2024-10-21 RX ADMIN — IBUPROFEN 800 MG: 800 TABLET, FILM COATED ORAL at 09:13

## 2024-10-21 ASSESSMENT — ACTIVITIES OF DAILY LIVING (ADL)
ADLS_ACUITY_SCORE: 18

## 2024-10-21 NOTE — DISCHARGE INSTRUCTIONS
"Postpartum Care at Home With Your Baby: Care Instructions  Overview     After childbirth (postpartum period), your body goes through many changes as you recover. In these weeks after delivery, try to take good care of yourself. Get rest whenever you can and accept help from others.  It may take 4 to 6 weeks to feel like yourself again, and possibly longer if you had a  birth. You may feel sore or very tired as you recover. After delivery, you may continue to have contractions as the uterus returns to the size it was before your pregnancy. You will also have some vaginal bleeding. And you may have pain around the vagina as you heal. Several days after delivery you may also have pain and swelling in your breasts as they fill with milk. There are things you can do at home to help ease these discomforts.  After childbirth, it's common to feel emotional. You may feel irritable, cry easily, and feel happy one minute and sad the next. This is called the \"baby blues.\" Hormone changes are one cause of these emotional changes. These feelings usually get better within a couple of weeks. If they don't, talk to your doctor or midwife.  In the first couple of weeks after you give birth, your doctor or midwife may want to check in with you and make a plan for follow-up care. You will likely have a complete postpartum visit in the first 3 months after delivery. At that time, your doctor or midwife will check on your recovery and see how you're doing. But if you have questions or concerns before then, you can always call your doctor or midwife.  Follow-up care is a key part of your treatment and safety. Be sure to make and go to all appointments, and call your doctor if you are having problems. It's also a good idea to know your test results and keep a list of the medicines you take.  How can you care for yourself at home?  Taking care of your body  Use pads instead of tampons for bleeding. After birth, you will have bloody " vaginal discharge. You may also pass some blood clots that shouldn't be bigger than an egg. Over the next 6 weeks or so, your bleeding should decrease a little every day and slowly change to a pinkish and then whitish discharge.  For cramps or mild pain, try an over-the-counter pain medicine, such as acetaminophen (Tylenol) or ibuprofen (Advil, Motrin). Read and follow all instructions on the label.  To ease pain around the vagina or from hemorrhoids:  Put ice or a cold pack on the area for 10 to 20 minutes at a time. Put a thin cloth between the ice and your skin.  Try sitting in a few inches of warm water (sitz bath) when you can or after bowel movements.  Clean yourself with a gentle squeeze of warm water from a bottle instead of wiping with toilet paper.  Use witch hazel or hemorrhoid pads (such as Tucks).  Try using a cold compress for sore and swollen breasts. And wear a supportive bra that fits.  Ease constipation by drinking plenty of fluids and eating high-fiber foods. Ask your doctor or midwife about over-the-counter stool softeners.  Activity  Rest when you can.  Ask for help from family or friends when you need it.  If you can, have another adult in your home for at least 2 or 3 days after birth.  When you feel ready, try to get some exercise every day. For many people, walking is a good choice. Don't do any heavy exercise until your doctor or midwife says it's okay.  Ask your doctor or midwife when it is okay to have vaginal sex.  If you don't want to get pregnant, talk to your doctor or midwife about birth control options. You can get pregnant even before your period returns. Also, you can get pregnant while you are breastfeeding.  Talk to your doctor or midwife if you want to get pregnant again. They can talk to you about when it is safe.  Emotional health  It's normal to have some sadness, anxiety, and mood swings after delivery. It may help to talk with a trusted friend or family member. You can  also call the Maternal Mental Health Hotline at 2-764-EFB-MAMA (1-113.545.6809) for support. If these mood changes last more than a couple of weeks, talk to your doctor or midwife.  When should you call for help?  Share this information with your partner, family, or a friend. They can help you watch for warning signs.  Call 911  anytime you think you may need emergency care. For example, call if:    You feel you cannot stop from hurting yourself, your baby, or someone else.     You passed out (lost consciousness).     You have chest pain, are short of breath, or cough up blood.     You have a seizure.   Where to get help 24 hours a day, 7 days a week   If you or someone you know talks about suicide, self-harm, a mental health crisis, a substance use crisis, or any other kind of emotional distress, get help right away. You can:    Call the Suicide and Crisis Lifeline at 988.     Call 1-373-760-TALK (1-660.634.6933).     Text HOME to 813413 to access the Crisis Text Line.   Consider saving these numbers in your phone.  Go to [employer] for more information or to chat online.  Call your doctor or midwife now or seek immediate medical care if:    You have signs of hemorrhage (too much bleeding), such as:  Heavy vaginal bleeding. This means that you are soaking through one or more pads in an hour. Or you pass blood clots bigger than an egg.  Feeling dizzy or lightheaded, or you feel like you may faint.  Feeling so tired or weak that you cannot do your usual activities.  A fast or irregular heartbeat.  New or worse belly pain.     You have signs of infection, such as:  A fever.  Increased pain, swelling, warmth, or redness from an incision or wound.  Frequent or painful urination or blood in your urine.  Vaginal discharge that smells bad.  New or worse belly pain.     You have symptoms of a blood clot in your leg (called a deep vein thrombosis), such as:  Pain in the calf, back of the knee, thigh, or groin.  Swelling  "in the leg or groin.  A color change on the leg or groin. The skin may be reddish or purplish, depending on your usual skin color.     You have signs of preeclampsia, such as:  Sudden swelling of your face, hands, or feet.  New vision problems (such as dimness, blurring, or seeing spots).  A severe headache.     You have signs of heart failure, such as:  New or increased shortness of breath.  New or worse swelling in your legs, ankles, or feet.  Sudden weight gain, such as more than 2 to 3 pounds in a day or 5 pounds in a week.  Feeling so tired or weak that you cannot do your usual activities.     You had spinal or epidural pain relief and have:  New or worse back pain.  Increased pain, swelling, warmth, or redness at the injection site.  Tingling, weakness, or numbness in your legs or groin.   Watch closely for changes in your health, and be sure to contact your doctor or midwife if:    Your vaginal bleeding isn't decreasing.     You feel sad, anxious, or hopeless for more than a few days.     You are having problems with your breasts or breastfeeding.   Where can you learn more?  Go to https://www.[employer].net/patiented  Enter Z768 in the search box to learn more about \"Postpartum Care at Home With Your Baby: Care Instructions.\"  Current as of: July 10, 2023  Content Version: 14.2 2024 [facility] [employer].   Care instructions adapted under license by your healthcare professional. If you have questions about a medical condition or this instruction, always ask your healthcare professional. [employer] disclaims any warranty or liability for your use of this information.      "

## 2024-10-21 NOTE — PLAN OF CARE
Goal Outcome Evaluation:      Plan of Care Reviewed With: patient    Overall Patient Progress: improvingOverall Patient Progress: improving     Vitals stable.  Postpartum checks within normal limits. Ambulating independently. Voiding spontaneously. Pain controlled with ibuprofen prn. Breast and formula feeding every 2-3 hours, baby latched better today per mother report. Baby needs stimulation to stay active at the breast, educated on how to keep baby stimulated for feeds. Supplementing with donor milk and plan to supplement with formula at home as needed. Mom is pumping/HE with feeds. Bonding well with baby, frequent holding observed. Spouse at bedside and attentive to mom and baby. Discharge education complete with patient and spouse. Questions encouraged and all questions answered. Patient has a pump at home. Bands checked. Discharged to home with family.       Problem: Adult Inpatient Plan of Care  Goal: Plan of Care Review  Outcome: Met  Flowsheets (Taken 10/21/2024 1336)  Plan of Care Reviewed With: patient  Overall Patient Progress: improving  Goal: Patient-Specific Goal (Individualized)  Outcome: Met  Goal: Absence of Hospital-Acquired Illness or Injury  Outcome: Met  Intervention: Prevent Skin Injury  Recent Flowsheet Documentation  Taken 10/21/2024 0910 by Vi Palmer, RN  Body Position: position changed independently  Intervention: Prevent Infection  Recent Flowsheet Documentation  Taken 10/21/2024 0910 by Vi Palmer, RN  Infection Prevention:   rest/sleep promoted   single patient room provided   hand hygiene promoted  Goal: Optimal Comfort and Wellbeing  Outcome: Met  Intervention: Monitor Pain and Promote Comfort  Recent Flowsheet Documentation  Taken 10/21/2024 0913 by Vi Palmer, RN  Pain Management Interventions: medication (see MAR)  Intervention: Provide Person-Centered Care  Recent Flowsheet Documentation  Taken 10/21/2024 0910 by Vi Palmer, RN  Trust  Relationship/Rapport:   care explained   choices provided   emotional support provided   empathic listening provided   questions encouraged   questions answered   thoughts/feelings acknowledged   reassurance provided  Goal: Readiness for Transition of Care  Outcome: Met  Flowsheets  Taken 10/21/2024 0921  Anticipated Changes Related to Illness: none  Concerns to be Addressed: all concerns addressed in this encounter  Barriers to Discharge: None  Taken 10/21/2024 0910  Concerns to be Addressed: all concerns addressed in this encounter  Intervention: Mutually Develop Transition Plan  Recent Flowsheet Documentation  Taken 10/21/2024 0921 by Vi Palmer, RN  Discharge Coordination/Progress: Done  Anticipated Changes Related to Illness: none  Concerns to be Addressed: all concerns addressed in this encounter  Taken 10/21/2024 0910 by Vi Palmer, RN  Transportation Concerns: none  Concerns to be Addressed: all concerns addressed in this encounter  Patient/Family Anticipated Services at Transition: none  Patient/Family Anticipates Transition to: home  Equipment Currently Used at Home: none     Problem: Hypertensive Disorders in Pregnancy  Goal: Patient-Fetal Stabilization  Outcome: Met     Problem: Diabetes in Pregnancy  Goal: Optimal Coping  Outcome: Met  Intervention: Support Wellbeing and Self-Management Success  Recent Flowsheet Documentation  Taken 10/21/2024 0910 by Vi Palmer, RN  Supportive Measures:   active listening utilized   self-care encouraged  Family/Support System Care:   support provided   self-care encouraged   presence promoted   involvement promoted  Goal: Blood Glucose Level Within Targeted Range  Outcome: Met     Problem: Postpartum (Vaginal Delivery)  Goal: Successful Parent Role Transition  Outcome: Met  Intervention: Support Parent Role Transition  Recent Flowsheet Documentation  Taken 10/21/2024 0910 by Vi Palmer, RN  Supportive Measures:   active listening  utilized   self-care encouraged  Parent-Child Attachment Promotion:   caring behavior modeled   skin-to-skin contact encouraged   participation in care promoted   parent/caregiver presence encouraged   rooming-in promoted   positive reinforcement provided  Goal: Hemostasis  Outcome: Met  Goal: Absence of Infection Signs and Symptoms  Outcome: Met  Goal: Anesthesia/Sedation Recovery  Outcome: Met  Goal: Optimal Pain Control and Function  Outcome: Met  Intervention: Prevent or Manage Pain  Recent Flowsheet Documentation  Taken 10/21/2024 0913 by Vi Palmer, RN  Pain Management Interventions: medication (see MAR)  Goal: Effective Urinary Elimination  Outcome: Met  Intervention: Monitor and Manage Urinary Retention  Recent Flowsheet Documentation  Taken 10/21/2024 0910 by Vi Palmer, RN  Urinary Elimination Promotion: frequent voiding encouraged

## 2024-10-21 NOTE — LACTATION NOTE
Lactation follow up before home. Georgina states breastfeeding improving. Mother was gestation diabetic. Had some low sugars, now wnl after get and starting to supplement with EBM and donor milk. Will switch to formula for home. Encouraged to continue to attempt breast, pump and supplement with any ebm and formula for home. Encouraged to watch for feeding cues to guide feeds, not going longer than 3 hours between feeds. Knows to do lots of STS. Educated on supplementing until milk is in and baby effective at breast. Community lactation resources given. Encouraged to call for questions or assistance before discharge home.

## 2024-10-21 NOTE — PLAN OF CARE
Goal Outcome Evaluation:      Plan of Care Reviewed With: patient    Overall Patient Progress: improvingOverall Patient Progress: improving         Patient's vital signs stable, up independently in room. Voiding spontaneously and drinking adequate fluids. Postpartum assessments within defined limits. Pain is being managed with PRN ibuprofen. Attempting to breast feed baby, although not very active at breast. Using donor milk to supplement, also hand expressing and pumping but not getting much out. Would like to talk to lactation today before discharge to talk about a feeding plan for home. Spouse at bedside, supportive of patient and baby.       Problem: Adult Inpatient Plan of Care  Goal: Plan of Care Review  Description: The Plan of Care Review/Shift note should be completed every shift.  The Outcome Evaluation is a brief statement about your assessment that the patient is improving, declining, or no change.  This information will be displayed automatically on your shift  note.  Outcome: Progressing  Flowsheets (Taken 10/21/2024 0541)  Plan of Care Reviewed With: patient  Overall Patient Progress: improving  Goal: Absence of Hospital-Acquired Illness or Injury  Outcome: Progressing  Intervention: Prevent Skin Injury  Recent Flowsheet Documentation  Taken 10/21/2024 0002 by Adriana Perales, RN  Body Position: position changed independently  Intervention: Prevent Infection  Recent Flowsheet Documentation  Taken 10/21/2024 0002 by Adriana Perales, RN  Infection Prevention:   rest/sleep promoted   hand hygiene promoted  Goal: Optimal Comfort and Wellbeing  Outcome: Progressing  Intervention: Provide Person-Centered Care  Recent Flowsheet Documentation  Taken 10/21/2024 0002 by Adriana Perales, RN  Trust Relationship/Rapport:   care explained   choices provided   questions answered   questions encouraged   reassurance provided   thoughts/feelings acknowledged  Goal: Readiness for Transition of Care  Outcome:  Progressing     Problem: Hypertensive Disorders in Pregnancy  Goal: Patient-Fetal Stabilization  Outcome: Progressing     Problem: Diabetes in Pregnancy  Goal: Optimal Coping  Outcome: Progressing  Intervention: Support Wellbeing and Self-Management Success  Recent Flowsheet Documentation  Taken 10/21/2024 0002 by Adriana Perales RN  Supportive Measures:   active listening utilized   decision-making supported   positive reinforcement provided   self-care encouraged  Family/Support System Care:   involvement promoted   presence promoted   support provided  Goal: Blood Glucose Level Within Targeted Range  Outcome: Progressing     Problem: Postpartum (Vaginal Delivery)  Goal: Successful Parent Role Transition  Outcome: Progressing  Intervention: Support Parent Role Transition  Recent Flowsheet Documentation  Taken 10/21/2024 0002 by Adriana Perales, RN  Supportive Measures:   active listening utilized   decision-making supported   positive reinforcement provided   self-care encouraged  Parent-Child Attachment Promotion:   caring behavior modeled   cue recognition promoted   face-to-face positioning promoted   interaction encouraged   parent/caregiver presence encouraged   participation in care promoted   positive reinforcement provided   rooming-in promoted   skin-to-skin contact encouraged   strengths emphasized  Goal: Hemostasis  Outcome: Progressing  Goal: Absence of Infection Signs and Symptoms  Outcome: Progressing  Goal: Anesthesia/Sedation Recovery  Outcome: Progressing  Goal: Optimal Pain Control and Function  Outcome: Progressing  Goal: Effective Urinary Elimination  Outcome: Progressing

## 2024-10-21 NOTE — PROGRESS NOTES
"PPD2  Feels well. Working on breastfeeding and supplementing.   /80 (BP Location: Left arm, Patient Position: Semi-Delacruz's, Cuff Size: Adult Regular)   Pulse 61   Temp 98.6  F (37  C) (Oral)   Resp 16   Ht 1.676 m (5' 6\")   Wt 86.1 kg (189 lb 12.8 oz)   SpO2 97%   Breastfeeding Unknown   BMI 30.63 kg/m     NAD  Abd Soft, NT, FF  Ext NT    PPD2 s/p   Discharge to home today  Follow-up 2 and 6 weeks    Lindsay Vidales MD   "

## 2024-10-21 NOTE — PROGRESS NOTES
PPD1-this note reflects information from earlier this afternoon at 1300    No c/o  AFVSS    No risk factors noted    Plan discharge tomorrow

## 2024-12-22 ENCOUNTER — HEALTH MAINTENANCE LETTER (OUTPATIENT)
Age: 24
End: 2024-12-22